# Patient Record
Sex: FEMALE | URBAN - METROPOLITAN AREA
[De-identification: names, ages, dates, MRNs, and addresses within clinical notes are randomized per-mention and may not be internally consistent; named-entity substitution may affect disease eponyms.]

---

## 2019-09-04 ENCOUNTER — TELEPHONE (OUTPATIENT)
Dept: FAMILY MEDICINE CLINIC | Age: 29
End: 2019-09-04

## 2019-09-04 NOTE — TELEPHONE ENCOUNTER
----- Message from Jessi Mills sent at 2019  3:31 PM EDT -----  Regarding: Telephone   Appointment not available    Caller's first and last name and relationship to patient (if not the patient):      Best contact number:  [phone]    Preferred date and time:  Asap    Scheduled appointment date and time:  Did not schedule     Reason for appointment:  Needs prescription for medication     Details to clarify the request:  Pt states her therapist advised that she gets on anti depressants so she would like to see someone to discuss that. Xiomara Ball    Outbound daryl to pt,  verified, she states she got a NP apt else where sooner. Thankyou for returning the call. Inflammation Suggestive Of Cancer Camouflage Histology Text: There was a dense lymphocytic infiltrate which prevented adequate histologic evaluation of adjacent structures.